# Patient Record
Sex: FEMALE | Race: WHITE | NOT HISPANIC OR LATINO | Employment: FULL TIME | URBAN - METROPOLITAN AREA
[De-identification: names, ages, dates, MRNs, and addresses within clinical notes are randomized per-mention and may not be internally consistent; named-entity substitution may affect disease eponyms.]

---

## 2020-08-20 ENCOUNTER — OFFICE VISIT (OUTPATIENT)
Dept: OBSTETRICS AND GYNECOLOGY | Facility: CLINIC | Age: 30
End: 2020-08-20
Payer: MEDICAID

## 2020-08-20 VITALS
HEIGHT: 64 IN | DIASTOLIC BLOOD PRESSURE: 70 MMHG | SYSTOLIC BLOOD PRESSURE: 110 MMHG | BODY MASS INDEX: 19.96 KG/M2 | WEIGHT: 116.88 LBS

## 2020-08-20 DIAGNOSIS — Z30.40 ENCOUNTER FOR REFILL OF PRESCRIPTION FOR CONTRACEPTION: ICD-10-CM

## 2020-08-20 DIAGNOSIS — Z01.419 WELL WOMAN EXAM: Primary | ICD-10-CM

## 2020-08-20 PROCEDURE — 99385 PR PREVENTIVE VISIT,NEW,18-39: ICD-10-PCS | Mod: S$PBB,,, | Performed by: OBSTETRICS & GYNECOLOGY

## 2020-08-20 PROCEDURE — 99999 PR PBB SHADOW E&M-NEW PATIENT-LVL III: ICD-10-PCS | Mod: PBBFAC,,, | Performed by: OBSTETRICS & GYNECOLOGY

## 2020-08-20 PROCEDURE — 88175 CYTOPATH C/V AUTO FLUID REDO: CPT

## 2020-08-20 PROCEDURE — 99999 PR PBB SHADOW E&M-NEW PATIENT-LVL III: CPT | Mod: PBBFAC,,, | Performed by: OBSTETRICS & GYNECOLOGY

## 2020-08-20 PROCEDURE — 99203 OFFICE O/P NEW LOW 30 MIN: CPT | Mod: PBBFAC,PO | Performed by: OBSTETRICS & GYNECOLOGY

## 2020-08-20 PROCEDURE — 99385 PREV VISIT NEW AGE 18-39: CPT | Mod: S$PBB,,, | Performed by: OBSTETRICS & GYNECOLOGY

## 2020-08-20 PROCEDURE — 87491 CHLMYD TRACH DNA AMP PROBE: CPT

## 2020-08-20 PROCEDURE — 87624 HPV HI-RISK TYP POOLED RSLT: CPT

## 2020-08-20 RX ORDER — ARIPIPRAZOLE 10 MG/1
TABLET ORAL
COMMUNITY
Start: 2020-07-26

## 2020-08-20 RX ORDER — LAMOTRIGINE 100 MG/1
100 TABLET ORAL DAILY
COMMUNITY
Start: 2020-07-31

## 2020-08-20 RX ORDER — NORGESTIMATE AND ETHINYL ESTRADIOL 0.25-0.035
1 KIT ORAL DAILY
Qty: 30 TABLET | Refills: 11 | Status: SHIPPED | OUTPATIENT
Start: 2020-08-20 | End: 2021-08-20

## 2020-08-20 NOTE — PROGRESS NOTES
SUBJECTIVE:   30 y.o. female No obstetric history on file.  for annual routine Pap and checkup. Patient's last menstrual period was 08/09/2020 (exact date)..  She has no unusual complaints. Reports unusually heavy period 2 months ago with normal cycle this past month. OCPs for contraception and patient desires to continue on OCPs. Desires STD testing today.        Past Medical History:   Diagnosis Date    Bipolar 1 disorder      History reviewed. No pertinent surgical history.  Social History     Socioeconomic History    Marital status: Single     Spouse name: Not on file    Number of children: Not on file    Years of education: Not on file    Highest education level: Not on file   Occupational History    Not on file   Social Needs    Financial resource strain: Not on file    Food insecurity     Worry: Not on file     Inability: Not on file    Transportation needs     Medical: Not on file     Non-medical: Not on file   Tobacco Use    Smoking status: Former Smoker   Substance and Sexual Activity    Alcohol use: Yes     Comment: socially    Drug use: Never    Sexual activity: Yes     Partners: Male     Birth control/protection: OCP   Lifestyle    Physical activity     Days per week: Not on file     Minutes per session: Not on file    Stress: Not on file   Relationships    Social connections     Talks on phone: Not on file     Gets together: Not on file     Attends Zoroastrian service: Not on file     Active member of club or organization: Not on file     Attends meetings of clubs or organizations: Not on file     Relationship status: Not on file   Other Topics Concern    Not on file   Social History Narrative    Not on file     Family History   Problem Relation Age of Onset    Diabetes Maternal Grandmother     Hypertension Maternal Grandmother     Stroke Maternal Grandmother     Heart attack Maternal Grandmother     Cancer Neg Hx     Ovarian cancer Neg Hx      OB History   No obstetric history on  "file.         Current Outpatient Medications   Medication Sig Dispense Refill    ARIPiprazole (ABILIFY) 10 MG Tab TAKE 1 2 (ONE HALF) TABLET BY MOUTH ONCE DAILY      lamoTRIgine (LAMICTAL) 100 MG tablet Take 100 mg by mouth once daily.      norgestimate-ethinyl estradioL (SPRINTEC, 28,) 0.25-35 mg-mcg per tablet Take 1 tablet by mouth once daily. 30 tablet 11     No current facility-administered medications for this visit.      Allergies: Patient has no known allergies.     ROS:  Constitutional: no weight loss, weight gain, fever, fatigue  Cardiovascular: No chest pain, palpitations  Respiratory:  Shortness of breath, or cough  Gastrointestinal: No diarrhea, bloody stool, nausea/vomiting, constipation  Genitourinary: No blood in urine, painful urination, urgency of urination, frequency of urination, incomplete emptying, incontinence, abnormal bleeding, painful periods, heavy periods, vaginal discharge, vaginal odor, painful intercourse, sexual problems, bleeding after intercourse.  Skin/Breast: No painful breasts, nipple discharge, masses, rash, ulcers  Neurological: No headache  Musculoskeletal: No joint pain, no muscular pain  Endocrine: No diabetes, hot flashes, hair loss  Psychiatric: No depression.      OBJECTIVE:   The patient appears well, alert, oriented x 3, in no distress.  /70   Ht 5' 4" (1.626 m)   Wt 53 kg (116 lb 13.5 oz)   LMP 08/09/2020 (Exact Date)   BMI 20.06 kg/m²   NECK: negative, no thyromegaly, trachea midline  SKIN: normal and no acne, striae, hirsutism  BREAST EXAM: breasts appear normal, no suspicious masses, no skin or nipple changes or axillary nodes  ABDOMEN: normal findings: soft, non-tender and no hernias, masses, or hepatosplenomegaly  GENITALIA: normal external genitalia, no erythema, no discharge  URETHRA: normal urethra, normal urethral meatus  VAGINA: Normal  CERVIX: no lesions or cervical motion tenderness  UTERUS: normal size, contour, position, consistency, " mobility, non-tender  ADNEXA: normal adnexa      ASSESSMENT:   .Velma was seen today for establish care.    Diagnoses and all orders for this visit:    Well woman exam  -     Liquid-Based Pap Smear, Screening  -     HPV High Risk Genotypes, PCR  -     C. trachomatis/N. gonorrhoeae by AMP DNA    Encounter for refill of prescription for contraception    Other orders  -     norgestimate-ethinyl estradioL (SPRINTEC, 28,) 0.25-35 mg-mcg per tablet; Take 1 tablet by mouth once daily.    F/u in one year for annual exam.     Leelee Acharya MD PGY-1  Obstetrics and Gynecology

## 2020-08-28 LAB
HPV HR 12 DNA SPEC QL NAA+PROBE: NEGATIVE
HPV16 AG SPEC QL: NEGATIVE
HPV18 DNA SPEC QL NAA+PROBE: NEGATIVE

## 2020-09-05 LAB
FINAL PATHOLOGIC DIAGNOSIS: NORMAL
Lab: NORMAL

## 2020-09-12 LAB
C TRACH DNA SPEC QL NAA+PROBE: NOT DETECTED
N GONORRHOEA DNA SPEC QL NAA+PROBE: NOT DETECTED

## 2020-10-30 ENCOUNTER — HOSPITAL ENCOUNTER (EMERGENCY)
Facility: OTHER | Age: 30
Discharge: HOME | End: 2020-10-30
Attending: EMERGENCY MEDICINE
Payer: MEDICAID

## 2020-10-30 VITALS
HEART RATE: 76 BPM | WEIGHT: 115 LBS | SYSTOLIC BLOOD PRESSURE: 117 MMHG | RESPIRATION RATE: 20 BRPM | BODY MASS INDEX: 19.63 KG/M2 | OXYGEN SATURATION: 99 % | HEIGHT: 64 IN | TEMPERATURE: 98 F | DIASTOLIC BLOOD PRESSURE: 63 MMHG

## 2020-10-30 DIAGNOSIS — Z78.9 BODY PIERCING: ICD-10-CM

## 2020-10-30 DIAGNOSIS — L02.91 ABSCESS: Primary | ICD-10-CM

## 2020-10-30 LAB
B-HCG UR QL: NEGATIVE
BACTERIA #/AREA URNS HPF: ABNORMAL /HPF
BILIRUB UR QL STRIP: NEGATIVE
CLARITY UR: CLEAR
COLOR UR: YELLOW
CTP QC/QA: YES
GLUCOSE UR QL STRIP: NEGATIVE
HCV AB SERPL QL IA: NEGATIVE
HGB UR QL STRIP: NEGATIVE
HIV 1+2 AB+HIV1 P24 AG SERPL QL IA: NEGATIVE
KETONES UR QL STRIP: NEGATIVE
LEUKOCYTE ESTERASE UR QL STRIP: ABNORMAL
MICROSCOPIC COMMENT: ABNORMAL
NITRITE UR QL STRIP: NEGATIVE
PH UR STRIP: 7 [PH] (ref 5–8)
PROT UR QL STRIP: NEGATIVE
RBC #/AREA URNS HPF: 0 /HPF (ref 0–4)
SP GR UR STRIP: 1.01 (ref 1–1.03)
SQUAMOUS #/AREA URNS HPF: 90 /HPF
URN SPEC COLLECT METH UR: ABNORMAL
UROBILINOGEN UR STRIP-ACNC: NEGATIVE EU/DL
WBC #/AREA URNS HPF: 8 /HPF (ref 0–5)

## 2020-10-30 PROCEDURE — 10120 INC&RMVL FB SUBQ TISS SMPL: CPT

## 2020-10-30 PROCEDURE — 86803 HEPATITIS C AB TEST: CPT

## 2020-10-30 PROCEDURE — 81025 URINE PREGNANCY TEST: CPT | Performed by: EMERGENCY MEDICINE

## 2020-10-30 PROCEDURE — 25000003 PHARM REV CODE 250: Performed by: PHYSICIAN ASSISTANT

## 2020-10-30 PROCEDURE — 81000 URINALYSIS NONAUTO W/SCOPE: CPT

## 2020-10-30 PROCEDURE — 86703 HIV-1/HIV-2 1 RESULT ANTBDY: CPT

## 2020-10-30 PROCEDURE — 99284 EMERGENCY DEPT VISIT MOD MDM: CPT | Mod: 25

## 2020-10-30 RX ORDER — HYDROCODONE BITARTRATE AND ACETAMINOPHEN 5; 325 MG/1; MG/1
1 TABLET ORAL
Status: COMPLETED | OUTPATIENT
Start: 2020-10-30 | End: 2020-10-30

## 2020-10-30 RX ORDER — LIDOCAINE HYDROCHLORIDE AND EPINEPHRINE 20; 10 MG/ML; UG/ML
10 INJECTION, SOLUTION INFILTRATION; PERINEURAL
Status: COMPLETED | OUTPATIENT
Start: 2020-10-30 | End: 2020-10-30

## 2020-10-30 RX ADMIN — HYDROCODONE BITARTRATE AND ACETAMINOPHEN 1 TABLET: 5; 325 TABLET ORAL at 04:10

## 2020-10-30 RX ADMIN — LIDOCAINE HYDROCHLORIDE,EPINEPHRINE BITARTRATE 10 ML: 20; .01 INJECTION, SOLUTION INFILTRATION; PERINEURAL at 04:10

## 2020-10-30 NOTE — ED PROVIDER NOTES
Encounter Date: 10/30/2020       History     Chief Complaint   Patient presents with    Rash     +Rash to R lower back, has subdermal peircing to R lower back. Seen at  and instructed to come to ED for further eval. Denies fever, admits to nausea. Taking PO clindamycin currently.        Patient is a 30-year-old female presenting to the emergency department for evaluation of possible infection to her low back.  The patient states that she has had a micro dermal piercing in her right low back for the past 8 years.  She states that about 3 days ago, she started noticing pain and redness around.  She states that she has some intermittent drainage.  She admits that she has been seen at urgent care and has been taking clindamycin for but is not getting any better.  She states she is concerned it is getting worse.  She cannot remove it herself.This is the extent of the patient's complaints at this time.       The history is provided by the patient.     Review of patient's allergies indicates:  No Known Allergies  Past Medical History:   Diagnosis Date    Bipolar 1 disorder      History reviewed. No pertinent surgical history.  Family History   Problem Relation Age of Onset    Diabetes Maternal Grandmother     Hypertension Maternal Grandmother     Stroke Maternal Grandmother     Heart attack Maternal Grandmother     Cancer Neg Hx     Ovarian cancer Neg Hx      Social History     Tobacco Use    Smoking status: Former Smoker   Substance Use Topics    Alcohol use: Yes     Comment: socially    Drug use: Never     Review of Systems   Constitutional: Negative for activity change, appetite change, chills, fatigue and fever.   HENT: Negative for congestion, rhinorrhea and sore throat.    Eyes: Negative for photophobia and visual disturbance.   Respiratory: Negative for cough, shortness of breath and wheezing.    Cardiovascular: Negative for chest pain.   Gastrointestinal: Negative for abdominal pain, diarrhea, nausea  and vomiting.   Genitourinary: Negative for dysuria, hematuria and urgency.   Musculoskeletal: Negative for back pain, myalgias and neck pain.   Skin: Negative for color change (erythema) and wound.   Neurological: Negative for weakness and headaches.   Psychiatric/Behavioral: Negative for agitation and confusion.       Physical Exam     Initial Vitals [10/30/20 1537]   BP Pulse Resp Temp SpO2   117/63 76 18 98.4 °F (36.9 °C) 99 %      MAP       --         Physical Exam    Nursing note and vitals reviewed.  Constitutional: Vital signs are normal. She appears well-developed and well-nourished. She is not diaphoretic. No distress.   Well-appearing,  female unaccompanied the emergency room.  Speaking clearly full sentences.  No acute distress.   HENT:   Head: Normocephalic and atraumatic.   Right Ear: External ear normal.   Left Ear: External ear normal.   Nose: Nose normal.   Mouth/Throat: Oropharynx is clear and moist.   Eyes: Conjunctivae and EOM are normal.   Neck: Normal range of motion. Neck supple.   Cardiovascular: Normal rate.   Pulmonary/Chest: No respiratory distress.   Musculoskeletal: Normal range of motion.   Neurological: She is alert and oriented to person, place, and time.   Skin: Skin is warm.        Psychiatric: She has a normal mood and affect. Her behavior is normal. Judgment and thought content normal.         ED Course   Foreign Body    Date/Time: 10/30/2020 5:14 PM  Performed by: Erika Barba PA-C  Authorized by: Julissa Reyes MD   Intake: back.  Anesthesia: local infiltration    Anesthesia:  Local Anesthetic: lidocaine 2% with epinephrine  Anesthetic total: 2 mL  1 objects recovered.  Objects recovered: 1 microdermal piercing  Post-procedure assessment: foreign body removed  Patient tolerance: Patient tolerated the procedure well with no immediate complications      Labs Reviewed   HIV 1 / 2 ANTIBODY   HEPATITIS C ANTIBODY   URINALYSIS, REFLEX TO URINE CULTURE   URINALYSIS  MICROSCOPIC   POCT URINE PREGNANCY             Medical Decision Making:   Initial Assessment:     Urgent evaluation a 30-year-old female presenting to the emergency department for evaluation of an infected piercing to the right low back.  Patient is afebrile, nontoxic appearing, hemodynamically stable.  Physical exam reveals a piercing in place to the right low back with concern for associated infection.  Will plan for incision and drainage ultimate removal piercing.    Clinical Tests:   Lab Tests: Ordered and Reviewed  ED Management:    Piercing was removed.  Also expressed some purulence at the area.  At this time, do not feel any further interventions indicated.  Advised the patient continue taking clindamycin.  Given follow-up instructions.  Discharged home in stable condition.The patient was instructed to follow up with a primary care provider in 2 days or to return to the emergency department for worsening symptoms. The treatment plan was discussed with the patient who demonstrated understanding and comfort with plan. The patient's history, physical exam, and plan of care was discussed with and agreed upon with my supervising physician.     This note was created using [employer]. There may be typographical errors secondary to dictation.                                Clinical Impression:     ICD-10-CM ICD-9-CM   1. Abscess  L02.91 682.9   2. Body piercing  Z78.9 V15.89                          ED Disposition Condition    Discharge Stable        ED Prescriptions     None        Follow-up Information     Follow up With Specialties Details Why Contact Info    Sita Cam MD Obstetrics, Obstetrics and Gynecology   [address] [address]  [phone]      [facility] Emergency Medicine   [address] [facility] [address]  [phone]                                       Erika Barba PA-C  10/30/20 9887

## 2020-10-30 NOTE — ED TRIAGE NOTES
+quarter sized infection noted to right lower back from piercing. Yellowish colored drainage noted from wound. Pt reports she has been taking clindamycin x3 days with no improvement. Pt reports nausea but denies fever, chills, vomiting, sob, cp

## 2021-06-15 ENCOUNTER — NURSE TRIAGE (OUTPATIENT)
Dept: ADMINISTRATIVE | Facility: CLINIC | Age: 31
End: 2021-06-15

## 2023-03-31 ENCOUNTER — HOSPITAL ENCOUNTER (OUTPATIENT)
Facility: OTHER | Age: 33
Discharge: HOME | End: 2023-04-01
Attending: EMERGENCY MEDICINE | Admitting: EMERGENCY MEDICINE
Payer: MEDICAID

## 2023-03-31 DIAGNOSIS — N76.0 BV (BACTERIAL VAGINOSIS): ICD-10-CM

## 2023-03-31 DIAGNOSIS — R11.2 NAUSEA & VOMITING: ICD-10-CM

## 2023-03-31 DIAGNOSIS — K52.9 ENTERITIS: Primary | ICD-10-CM

## 2023-03-31 DIAGNOSIS — B96.89 BV (BACTERIAL VAGINOSIS): ICD-10-CM

## 2023-03-31 LAB
ALBUMIN SERPL BCP-MCNC: 3.9 G/DL (ref 3.5–5.2)
ALP SERPL-CCNC: 61 U/L (ref 55–135)
ALT SERPL W/O P-5'-P-CCNC: 16 U/L (ref 10–44)
ANION GAP SERPL CALC-SCNC: 7 MMOL/L (ref 8–16)
AST SERPL-CCNC: 22 U/L (ref 10–40)
B-HCG UR QL: NEGATIVE
BACTERIA #/AREA URNS HPF: ABNORMAL /HPF
BASOPHILS # BLD AUTO: 0.04 K/UL (ref 0–0.2)
BASOPHILS NFR BLD: 0.4 % (ref 0–1.9)
BILIRUB SERPL-MCNC: 0.4 MG/DL (ref 0.1–1)
BILIRUB UR QL STRIP: NEGATIVE
BUN SERPL-MCNC: 6 MG/DL (ref 6–20)
CALCIUM SERPL-MCNC: 8.8 MG/DL (ref 8.7–10.5)
CHLORIDE SERPL-SCNC: 103 MMOL/L (ref 95–110)
CLARITY UR: CLEAR
CO2 SERPL-SCNC: 25 MMOL/L (ref 23–29)
COLOR UR: YELLOW
CREAT SERPL-MCNC: 0.7 MG/DL (ref 0.5–1.4)
CTP QC/QA: YES
DIFFERENTIAL METHOD: ABNORMAL
EOSINOPHIL # BLD AUTO: 0.1 K/UL (ref 0–0.5)
EOSINOPHIL NFR BLD: 1 % (ref 0–8)
ERYTHROCYTE [DISTWIDTH] IN BLOOD BY AUTOMATED COUNT: 12 % (ref 11.5–14.5)
EST. GFR  (NO RACE VARIABLE): >60 ML/MIN/1.73 M^2
GLUCOSE SERPL-MCNC: 88 MG/DL (ref 70–110)
GLUCOSE UR QL STRIP: NEGATIVE
HCT VFR BLD AUTO: 47.8 % (ref 37–48.5)
HCV AB SERPL QL IA: NEGATIVE
HGB BLD-MCNC: 15.9 G/DL (ref 12–16)
HGB UR QL STRIP: NEGATIVE
HIV 1+2 AB+HIV1 P24 AG SERPL QL IA: NEGATIVE
IMM GRANULOCYTES # BLD AUTO: 0.03 K/UL (ref 0–0.04)
IMM GRANULOCYTES NFR BLD AUTO: 0.3 % (ref 0–0.5)
KETONES UR QL STRIP: NEGATIVE
LDH SERPL L TO P-CCNC: 0.78 MMOL/L (ref 0.5–2.2)
LEUKOCYTE ESTERASE UR QL STRIP: ABNORMAL
LYMPHOCYTES # BLD AUTO: 2 K/UL (ref 1–4.8)
LYMPHOCYTES NFR BLD: 20.9 % (ref 18–48)
MCH RBC QN AUTO: 28.9 PG (ref 27–31)
MCHC RBC AUTO-ENTMCNC: 33.3 G/DL (ref 32–36)
MCV RBC AUTO: 87 FL (ref 82–98)
MICROSCOPIC COMMENT: ABNORMAL
MONOCYTES # BLD AUTO: 0.8 K/UL (ref 0.3–1)
MONOCYTES NFR BLD: 8.3 % (ref 4–15)
NEUTROPHILS # BLD AUTO: 6.7 K/UL (ref 1.8–7.7)
NEUTROPHILS NFR BLD: 69.1 % (ref 38–73)
NITRITE UR QL STRIP: NEGATIVE
NRBC BLD-RTO: 0 /100 WBC
PH UR STRIP: 7 [PH] (ref 5–8)
PLATELET # BLD AUTO: 213 K/UL (ref 150–450)
PMV BLD AUTO: 12 FL (ref 9.2–12.9)
POTASSIUM SERPL-SCNC: 3.9 MMOL/L (ref 3.5–5.1)
PROT SERPL-MCNC: 7 G/DL (ref 6–8.4)
PROT UR QL STRIP: ABNORMAL
RBC # BLD AUTO: 5.51 M/UL (ref 4–5.4)
RBC #/AREA URNS HPF: 1 /HPF (ref 0–4)
SAMPLE: NORMAL
SODIUM SERPL-SCNC: 135 MMOL/L (ref 136–145)
SP GR UR STRIP: 1.02 (ref 1–1.03)
SQUAMOUS #/AREA URNS HPF: 2 /HPF
URN SPEC COLLECT METH UR: ABNORMAL
UROBILINOGEN UR STRIP-ACNC: NEGATIVE EU/DL
WBC # BLD AUTO: 9.65 K/UL (ref 3.9–12.7)
WBC #/AREA URNS HPF: 9 /HPF (ref 0–5)

## 2023-03-31 PROCEDURE — 25000003 PHARM REV CODE 250: Performed by: PHYSICIAN ASSISTANT

## 2023-03-31 PROCEDURE — 96361 HYDRATE IV INFUSION ADD-ON: CPT

## 2023-03-31 PROCEDURE — 63600175 PHARM REV CODE 636 W HCPCS: Performed by: PHYSICIAN ASSISTANT

## 2023-03-31 PROCEDURE — 82272 OCCULT BLD FECES 1-3 TESTS: CPT

## 2023-03-31 PROCEDURE — 25500020 PHARM REV CODE 255: Performed by: PHYSICIAN ASSISTANT

## 2023-03-31 PROCEDURE — 81000 URINALYSIS NONAUTO W/SCOPE: CPT | Performed by: EMERGENCY MEDICINE

## 2023-03-31 PROCEDURE — 99285 EMERGENCY DEPT VISIT HI MDM: CPT | Mod: 25

## 2023-03-31 PROCEDURE — G0378 HOSPITAL OBSERVATION PER HR: HCPCS

## 2023-03-31 PROCEDURE — 87389 HIV-1 AG W/HIV-1&-2 AB AG IA: CPT | Performed by: PHYSICIAN ASSISTANT

## 2023-03-31 PROCEDURE — 96365 THER/PROPH/DIAG IV INF INIT: CPT

## 2023-03-31 PROCEDURE — 96376 TX/PRO/DX INJ SAME DRUG ADON: CPT

## 2023-03-31 PROCEDURE — 80053 COMPREHEN METABOLIC PANEL: CPT | Performed by: PHYSICIAN ASSISTANT

## 2023-03-31 PROCEDURE — 85025 COMPLETE CBC W/AUTO DIFF WBC: CPT | Performed by: PHYSICIAN ASSISTANT

## 2023-03-31 PROCEDURE — 86803 HEPATITIS C AB TEST: CPT | Performed by: PHYSICIAN ASSISTANT

## 2023-03-31 PROCEDURE — 81025 URINE PREGNANCY TEST: CPT | Performed by: EMERGENCY MEDICINE

## 2023-03-31 PROCEDURE — 96375 TX/PRO/DX INJ NEW DRUG ADDON: CPT

## 2023-03-31 PROCEDURE — 87040 BLOOD CULTURE FOR BACTERIA: CPT | Mod: 59 | Performed by: PHYSICIAN ASSISTANT

## 2023-03-31 RX ORDER — ONDANSETRON 2 MG/ML
4 INJECTION INTRAMUSCULAR; INTRAVENOUS EVERY 8 HOURS PRN
Status: DISCONTINUED | OUTPATIENT
Start: 2023-03-31 | End: 2023-04-01 | Stop reason: HOSPADM

## 2023-03-31 RX ORDER — KETOROLAC TROMETHAMINE 30 MG/ML
10 INJECTION, SOLUTION INTRAMUSCULAR; INTRAVENOUS
Status: COMPLETED | OUTPATIENT
Start: 2023-03-31 | End: 2023-03-31

## 2023-03-31 RX ORDER — TALC
6 POWDER (GRAM) TOPICAL NIGHTLY PRN
Status: DISCONTINUED | OUTPATIENT
Start: 2023-03-31 | End: 2023-04-01 | Stop reason: HOSPADM

## 2023-03-31 RX ORDER — HYOSCYAMINE SULFATE 0.5 MG/ML
0.5 INJECTION, SOLUTION SUBCUTANEOUS EVERY 6 HOURS PRN
Status: DISCONTINUED | OUTPATIENT
Start: 2023-03-31 | End: 2023-04-01 | Stop reason: HOSPADM

## 2023-03-31 RX ORDER — KETOROLAC TROMETHAMINE 30 MG/ML
15 INJECTION, SOLUTION INTRAMUSCULAR; INTRAVENOUS EVERY 6 HOURS PRN
Status: DISCONTINUED | OUTPATIENT
Start: 2023-03-31 | End: 2023-04-01 | Stop reason: HOSPADM

## 2023-03-31 RX ORDER — SODIUM CHLORIDE, SODIUM LACTATE, POTASSIUM CHLORIDE, CALCIUM CHLORIDE 600; 310; 30; 20 MG/100ML; MG/100ML; MG/100ML; MG/100ML
INJECTION, SOLUTION INTRAVENOUS CONTINUOUS
Status: DISCONTINUED | OUTPATIENT
Start: 2023-03-31 | End: 2023-04-01 | Stop reason: HOSPADM

## 2023-03-31 RX ORDER — ACETAMINOPHEN 325 MG/1
650 TABLET ORAL EVERY 8 HOURS PRN
Status: DISCONTINUED | OUTPATIENT
Start: 2023-03-31 | End: 2023-04-01 | Stop reason: HOSPADM

## 2023-03-31 RX ORDER — ONDANSETRON 2 MG/ML
4 INJECTION INTRAMUSCULAR; INTRAVENOUS
Status: COMPLETED | OUTPATIENT
Start: 2023-03-31 | End: 2023-03-31

## 2023-03-31 RX ADMIN — HYOSCYAMINE SULFATE 0.5 MG: 0.5 INJECTION, SOLUTION SUBCUTANEOUS at 08:03

## 2023-03-31 RX ADMIN — ONDANSETRON 4 MG: 2 INJECTION INTRAMUSCULAR; INTRAVENOUS at 12:03

## 2023-03-31 RX ADMIN — IOHEXOL 75 ML: 350 INJECTION, SOLUTION INTRAVENOUS at 12:03

## 2023-03-31 RX ADMIN — SODIUM CHLORIDE 1000 ML: 9 INJECTION, SOLUTION INTRAVENOUS at 12:03

## 2023-03-31 RX ADMIN — PIPERACILLIN AND TAZOBACTAM 4.5 G: 4; .5 INJECTION, POWDER, LYOPHILIZED, FOR SOLUTION INTRAVENOUS; PARENTERAL at 01:03

## 2023-03-31 RX ADMIN — KETOROLAC TROMETHAMINE 15 MG: 30 INJECTION, SOLUTION INTRAMUSCULAR; INTRAVENOUS at 11:03

## 2023-03-31 RX ADMIN — KETOROLAC TROMETHAMINE 10 MG: 30 INJECTION, SOLUTION INTRAMUSCULAR; INTRAVENOUS at 12:03

## 2023-03-31 RX ADMIN — Medication 6 MG: at 11:03

## 2023-03-31 RX ADMIN — SODIUM CHLORIDE, POTASSIUM CHLORIDE, SODIUM LACTATE AND CALCIUM CHLORIDE: 600; 310; 30; 20 INJECTION, SOLUTION INTRAVENOUS at 06:03

## 2023-03-31 NOTE — FIRST PROVIDER EVALUATION
Emergency Department TeleTriage Encounter Note      CHIEF COMPLAINT    Chief Complaint   Patient presents with    Abdominal Pain     C/o RLQ pain 5/10, n/d, fever/chills, body aches that began on Wednesday. -vomiting. Denies any other symptoms. Recent miscarriage x 2 wks ago. Stated [place]. VSS        VITAL SIGNS   Initial Vitals [03/31/23 1006]   BP Pulse Resp Temp SpO2   109/72 87 17 98.1 °F (36.7 °C) 100 %      MAP       --            ALLERGIES    Review of patient's allergies indicates:  No Known Allergies    PROVIDER TRIAGE NOTE  ***      ORDERS  Labs Reviewed   URINALYSIS, REFLEX TO URINE CULTURE   POCT URINE PREGNANCY       ED Orders (720h ago, onward)      Start Ordered     Status Ordering Provider    03/31/23 1018 03/31/23 1017  POCT urine pregnancy  Once         Final result EDNA OLVERA    03/31/23 1018 03/31/23 1017  Urinalysis, Reflex to Urine Culture Urine, Clean Catch  STAT         In process EDNA OLVERA              Virtual Visit Note: The provider triage portion of this emergency department evaluation and documentation was performed via [employer], a HIPAA-compliant telemedicine application, in concert with a tele-presenter in the room. A face to face patient evaluation with one of my colleagues will occur once the patient is placed in an emergency department room.      DISCLAIMER: This note was prepared with [employer] voice recognition transcription software. Garbled syntax, mangled pronouns, and other bizarre constructions may be attributed to that software system.

## 2023-03-31 NOTE — ED NOTES
Pt resting on stretcher. AAO x 3. HOB elevated. RR even and unlabored. Pain 0/10. Restroom and comfort needs addressed. NAD noted. Pt updated on plan of care.  Call light within reach. Side rails up x 1. IVF still infusing bolus continuously at a rapid pace. Will continue to monitor. Clear liquid meal tray with 75 % completed, & semi soft extra meals tolerated.

## 2023-03-31 NOTE — ED NOTES
0.9% NS still infusing bolus. About 1800 mL left to infuse. IV site flushed and fluids infusing steadily. No pressure bag needed at present.

## 2023-03-31 NOTE — FIRST PROVIDER EVALUATION
Emergency Department TeleTriage Encounter Note      CHIEF COMPLAINT    Chief Complaint   Patient presents with    Abdominal Pain     C/o RLQ pain 5/10, n/d, fever/chills, body aches that began on Wednesday. -vomiting. Denies any other symptoms. Recent miscarriage x 2 wks ago. Stated [place]. VSS        VITAL SIGNS   Initial Vitals [03/31/23 1006]   BP Pulse Resp Temp SpO2   109/72 87 17 98.1 °F (36.7 °C) 100 %      MAP       --            ALLERGIES    Review of patient's allergies indicates:  No Known Allergies    PROVIDER TRIAGE NOTE  This is a teletriage evaluation of a 32 y.o. female presenting to the ED with c/o RLQ pain with N/V/D, fever. +recent miscarriage     PE: tearful. Non-toxic/well-appearing. No respiratory distress, speaks in full sentences without issue. No active emesis nor cough. Normal eye contact and mentation.     Plan: labs, imaging, meds. Further/augmented workup at discretion of examining provider.     All ED beds are full at present; patient notified of this status.  Patient seen and medically screened by MURTAZA via teletriage. Orders initiated at triage to expedite care.  Patient is stable and will be placed in an ED bed when available.  Care will be transferred to an alternate provider when patient has been placed in an Exam Room further exam, additional orders, and disposition.         ORDERS  Labs Reviewed   URINALYSIS, REFLEX TO URINE CULTURE - Abnormal; Notable for the following components:       Result Value    Protein, UA Trace (*)     Leukocytes, UA Trace (*)     All other components within normal limits    Narrative:     Specimen Source->Urine   URINALYSIS MICROSCOPIC - Abnormal; Notable for the following components:    WBC, UA 9 (*)     All other components within normal limits    Narrative:     Specimen Source->Urine   CBC W/ AUTO DIFFERENTIAL   COMPREHENSIVE METABOLIC PANEL   POCT URINE PREGNANCY       ED Orders (720h ago, onward)      Start  Ordered     Status Ordering Provider    03/31/23 1115 03/31/23 1107  sodium chloride 0.9% bolus 1,000 mL 1,000 mL  ED 1 Time         Ordered BRENDEN PARKER MARIO    03/31/23 1115 03/31/23 1107  ketorolac injection 9.999 mg  ED 1 Time         Ordered BRENDEN PARKER MARIO    03/31/23 1115 03/31/23 1107  ondansetron injection 4 mg  ED 1 Time         Ordered BRENDEN PARKER MARIO    03/31/23 1108 03/31/23 1107  CBC auto differential  STAT         Ordered BRENDEN PARKER MARIO    03/31/23 1108 03/31/23 1107  Comprehensive metabolic panel  STAT         Ordered BRENDEN PARKER MARIO    03/31/23 1108 03/31/23 1107  CT Abdomen Pelvis With Contrast  1 time imaging         Ordered BRENDEN PARKER MARIO    03/31/23 1018 03/31/23 1017  POCT urine pregnancy  Once         Final result EDNA OLVERA    03/31/23 1018 03/31/23 1017  Urinalysis, Reflex to Urine Culture Urine, Clean Catch  STAT         Final result EDNA OLVERA    03/31/23 1017 03/31/23 1017  Urinalysis Microscopic  Once         Final result EDNA OLVERA              Virtual Visit Note: The provider triage portion of this emergency department evaluation and documentation was performed via [employer], a HIPAA-compliant telemedicine application, in concert with a tele-presenter in the room. A face to face patient evaluation with one of my colleagues will occur once the patient is placed in an emergency department room.      DISCLAIMER: This note was prepared with [employer] voice recognition transcription software. Garbled syntax, mangled pronouns, and other bizarre constructions may be attributed to that software system.

## 2023-03-31 NOTE — ED NOTES
Commode hat in place for stool specimen collection. Pt aware stool specimen needed and how to use the hat. Pt also aware to call nurse/tech once specimen has been obtained.

## 2023-03-31 NOTE — H&P
ED Observation Unit  History and Physical      I assumed care of this patient from the Main ED at onset of observation time, 1345 on 03/31/2023.       History of Present Illness:    32-year-old female with PMH of bipolar 1 disorder presents the ED  today for evaluation of lower abdominal pain which began on 3/29. She states recent history is significant for travel to [place].  States she did began with some nausea and pain at that time with some diarrhea.  She states pain has intensified.  She has tried hydrocodone with some modest improvement pain.  She reports fever with T-max of 102° that does reduced with the hydrocodone.  Denies any bloody diarrhea.  Does report some tenesmus and that food exacerbates the pain.  Reports decreased appetite.  Denies any vomiting. She does report a spontaneous miscarriage 2 weeks ago and had outpatient ultrasound confirming no retained products or abnormalities earlier this week.  She denies any recent antibiotic use other than Bactrim last month.  She denies any known sick contacts or suspected food contamination.    ED workup with reassuring findings on blood work.  CT abdomen pelvic revealed:  Fluid-filled small bowel, loops are upper limit of normal in caliber.  Finding is nonspecific, correlation with any history of enteritis.    Patient admitted to ED OU for supportive care for enteritis, abdominal pain.    PMHx   Past Medical History:   Diagnosis Date    Bipolar 1 disorder       No past surgical history on file.     Family Hx   Family History   Problem Relation Age of Onset    Diabetes Maternal Grandmother     Hypertension Maternal Grandmother     Stroke Maternal Grandmother     Heart attack Maternal Grandmother     Cancer Neg Hx     Ovarian cancer Neg Hx         Social Hx   Social History     Socioeconomic History    Marital status: Single   Tobacco Use    Smoking status: Former   Substance and Sexual Activity    Alcohol use: Yes     Comment: socially    Drug use:  "Never    Sexual activity: Yes     Partners: Male     Birth control/protection: OCP        Vital Signs   Vitals:    03/31/23 1006 03/31/23 1400 03/31/23 1502   BP: 109/72 (!) 97/53 (!) 91/59   BP Location: Left arm Right arm    Patient Position: Sitting Lying    Pulse: 87 63 61   Resp: 17 16    Temp: 98.1 °F (36.7 °C) 99.2 °F (37.3 °C)    TempSrc: Oral Oral    SpO2: 100% 100% 100%   Weight: 49.9 kg (110 lb)     Height: 5' 4" (1.626 m)          Review of Systems  As per [facility]    Physical Exam  Nursing note and vital signs reviewed.  Appearance: No acute distress.  Eyes: No conjunctival injection.  ENT: Oropharynx clear.    Chest/ Respiratory: Clear to auscultation bilaterally.  Good air movement.  No wheezes.  No rhonchi. No rales. No accessory muscle use.  Cardiovascular: Regular rate and rhythm.  No murmurs. No gallops. No rubs.  Abdomen: Soft.  Not distended. Lower abdomen TTP.   No guarding.  No rebound. Non-peritoneal.  Musculoskeletal: Good range of motion all joints.  No deformities.  Neck supple.  No meningismus.  Skin: No rashes seen.  Good turgor.  No abrasions.  No ecchymoses.  Neurologic: Motor intact.  Sensation intact.    Mental Status:  Alert and oriented x 3.  Appropriate, conversant.     Medications:   Scheduled Meds:  Continuous Infusions:   lactated ringers       PRN Meds:.acetaminophen, hyoscyamine, ketorolac, melatonin, ondansetron, promethazine (PHENERGAN) IVPB      Assessment/Plan:    1) enteritis  -given Zosyn in the ED.  Will hold off on additional antibiotics pending stool cultures if able to obtain.  -IV fluids    2) abdominal pain  - p.r.n. analgesics and antispasmodics.    Case was discussed with the ED provider, Krista Tabor PA-C.         "

## 2023-03-31 NOTE — ED PROVIDER NOTES
Encounter Date: 3/31/2023       History     Chief Complaint   Patient presents with    Abdominal Pain     C/o RLQ pain 5/10, n/d, fever/chills, body aches that began on Wednesday. -vomiting. Denies any other symptoms. Recent miscarriage x 2 wks ago. Stated just return from out the country on Wednesday. VSS      Afebrile 32-year-old female with PMH of bipolar 1 disorder presents the ED for evaluation of lower abdominal pain.  He reports that pain began on Wednesday.  She states recent history is significant for travel to [place].  States she did began with some nausea and pain at that time with some diarrhea.  She states pain has intensified.  She has tried hydrocodone with some modest improvement pain.  She reports fever with T-max of 102° that does reduced with the hydrocodone.  Denies any bloody diarrhea.  Does report some tenesmus and that food exacerbates the pain.  Reports decreased appetite.  Denies any vomiting.  Denies any URI symptoms.  Denies any  symptoms including vaginal bleeding or discharge.  She does report miss spontaneous a be 2 weeks ago and had outpatient ultrasound confirming no retained products or abnormalities earlier this week.  She denies any recent antibiotic use other than Bactrim last month.  She denies any known sick contacts or suspected food contamination    The history is provided by the patient.   Review of patient's allergies indicates:  No Known Allergies  Past Medical History:   Diagnosis Date    Bipolar 1 disorder      No past surgical history on file.  Family History   Problem Relation Age of Onset    Diabetes Maternal Grandmother     Hypertension Maternal Grandmother     Stroke Maternal Grandmother     Heart attack Maternal Grandmother     Cancer Neg Hx     Ovarian cancer Neg Hx      Social History     Tobacco Use    Smoking status: Former   Substance Use Topics    Alcohol use: Yes     Comment: socially    Drug use: Never     Review of Systems  see HPI   Physical Exam      Initial Vitals [03/31/23 1006]   BP Pulse Resp Temp SpO2   109/72 87 17 98.1 °F (36.7 °C) 100 %      MAP       --         Physical Exam    Nursing note and vitals reviewed.  Constitutional: Vital signs are normal. She appears well-developed and well-nourished. She is cooperative.  Non-toxic appearance. She does not appear ill. She appears distressed.   HENT:   Head: Normocephalic and atraumatic.   Dry mucous membranes   Eyes: Conjunctivae and lids are normal.   Neck: Trachea normal. Neck supple. No stridor present. No tracheal deviation present.   Normal range of motion.  Cardiovascular:  Normal rate and regular rhythm.           Abdominal: Abdomen is soft. There is abdominal tenderness in the right lower quadrant and suprapubic area.   No right CVA tenderness.  No left CVA tenderness. There is guarding and tenderness at McBurney's point. negative obturator sign, negative psoas sign and negative Rovsing's sign  Musculoskeletal:      Cervical back: Normal range of motion and neck supple.     Neurological: She is alert and oriented to person, place, and time. GCS eye subscore is 4. GCS verbal subscore is 5. GCS motor subscore is 6.   Skin: Skin is warm, dry and intact. No rash noted.   Psychiatric: She has a normal mood and affect. Her speech is normal and behavior is normal. Thought content normal.       ED Course   Procedures  Labs Reviewed   URINALYSIS, REFLEX TO URINE CULTURE - Abnormal; Notable for the following components:       Result Value    Protein, UA Trace (*)     Leukocytes, UA Trace (*)     All other components within normal limits    Narrative:     Specimen Source->Urine   URINALYSIS MICROSCOPIC - Abnormal; Notable for the following components:    WBC, UA 9 (*)     All other components within normal limits    Narrative:     Specimen Source->Urine   CBC W/ AUTO DIFFERENTIAL - Abnormal; Notable for the following components:    RBC 5.51 (*)     All other components within normal limits   COMPREHENSIVE  METABOLIC PANEL - Abnormal; Notable for the following components:    Sodium 135 (*)     Anion Gap 7 (*)     All other components within normal limits   CULTURE, BLOOD   CULTURE, BLOOD   CLOSTRIDIUM DIFFICILE   CULTURE, STOOL   HIV 1 / 2 ANTIBODY    Narrative:     Release to patient->Immediate   HEPATITIS C ANTIBODY    Narrative:     Release to patient->Immediate   OCCULT BLOOD X 1, STOOL   WBC, STOOL   ROTAVIRUS ANTIGEN, STOOL   CALPROTECTIN, STOOL   GIARDIA/CRYPTOSPORIDIUM (EIA)   STOOL EXAM-OVA,CYSTS,PARASITES   POCT URINE PREGNANCY   ISTAT LACTATE          Imaging Results              CT Abdomen Pelvis With Contrast (Final result)  Result time 03/31/23 12:40:22      Final result by Adelso Braga MD (03/31/23 12:40:22)                   Impression:      1. Fluid-filled small bowel, loops are upper limit of normal in caliber.  Finding is nonspecific, correlation with any history of enteritis.  2. Suspected uterine leiomyoma, further evaluation with ultrasound as warranted.  3. Please see above for additional findings.      Electronically signed by: Adelso Braga MD  Date:    03/31/2023  Time:    12:40               Narrative:    EXAMINATION:  CT ABDOMEN PELVIS WITH CONTRAST    CLINICAL HISTORY:  RLQ abdominal pain (Age >= 14y);    TECHNIQUE:  Low dose axial images, sagittal and coronal reformations were obtained from the lung bases to the pubic symphysis following the IV administration of 75 mL of Omnipaque 350 .  Oral contrast was not given.    COMPARISON:  None.    FINDINGS:  Images of the lower thorax are remarkable for bilateral dependent atelectasis.    The liver, spleen, pancreas, gallbladder and adrenal glands are grossly unremarkable.  There is no biliary dilation or ascites.  The portal vein, splenic vein, SMV, celiac axis and SMA all are patent.  No significant abdominal lymphadenopathy.    The kidneys enhance symmetrically without hydronephrosis.  There is right nonobstructive nephrolithiasis.   The bilateral ureters are unable to be followed in their entirety the urinary bladder, no definite calculi seen or secondary findings to suggest obstructive uropathy.  The urinary bladder is distended without wall thickening.  The uterus has a somewhat lobular contour.  There is a dominant follicle or small cyst within the right ovary measuring 1.6 cm.  The left adnexa is unremarkable.  There is a small amount of fluid in the pelvis.    The large bowel is for the most part decompressed.  Terminal ileum and appendix are unremarkable.  There are several fluid-filled small bowel loops throughout the abdomen and pelvis, some of which are upper limit of normal in caliber.  No significant small bowel wall thickening.  There are several scattered shotty periaortic, pericaval, and mesenteric lymph nodes.  No focal organized pelvic fluid collection.    No acute osseous abnormality.  No significant inguinal lymphadenopathy.                                       Medications   melatonin tablet 6 mg (has no administration in time range)   acetaminophen tablet 650 mg (has no administration in time range)   lactated ringers infusion (has no administration in time range)   ketorolac injection 15 mg (has no administration in time range)   ondansetron injection 4 mg (has no administration in time range)   promethazine (PHENERGAN) 12.5 mg in dextrose 5 % (D5W) 50 mL IVPB (has no administration in time range)   hyoscyamine injection 0.5 mg (has no administration in time range)   sodium chloride 0.9% bolus 1,000 mL 1,000 mL (1,000 mLs Intravenous New Bag 3/31/23 1214)   ketorolac injection 9.999 mg (9.999 mg Intravenous Given 3/31/23 1213)   ondansetron injection 4 mg (4 mg Intravenous Given 3/31/23 1212)   piperacillin-tazobactam (ZOSYN) 4.5 g in dextrose 5 % in water (D5W) 5 % 100 mL IVPB (MB+) (4.5 g Intravenous New Bag 3/31/23 1302)   sodium chloride 0.9% bolus 1,000 mL 1,000 mL (1,000 mLs Intravenous New Bag 3/31/23 1213)   iohexoL  (OMNIPAQUE 350) injection 75 mL (75 mLs Intravenous Given 3/31/23 1230)     Medical Decision Making:   History:   Old Medical Records: I decided to obtain old medical records.  Initial Assessment:   Emergent evaluation of 32-year-old who is typically well presents to the ED evaluation of lower abdominal pain with associated nausea diarrhea that has since improved.  She appears uncomfortable secondary to pain.  Afebrile.  Mucous membranes dry.  Exam notable for tenderness palpation of suprapubic and right lower quadrant, McBurney point tenderness with some guarding; no rebound or rigidity.  Differential Diagnosis:   Differential Diagnosis includes, but is not limited to:  AAA, aortic dissection, mesenteric ischemia, perforated viscous, MI/ACS, SBO/volvulus, incarcerated/strangulated hernia, intussusception, ileus, appendicitis, cholecystitis, cholangitis, diverticulitis, esophagitis, hepatitis, nephrolithiasis, pancreatitis, gastroenteritis, colitis, IBD/IBS, biliary colic, GERD, PUD, constipation, UTI/pyelonephritis,  disorder.      Clinical Tests:   Lab Tests: Reviewed and Ordered  Radiological Study: Reviewed and Ordered  ED Management:  Patient seen in tele triage process were initial labs and CT were ordered.  Will add additional labs including blood cultures given the fever and high suspicion of intra-abdominal process will start empiric antibiotics and 2 L of IV fluids ordered as per her weight.  Lactic is normal.  No leukocytosis or left shift.  Mild hyponatremia consistent with dehydration.  No overt infections in urine.  CT reveals dilated small bowel loops.  Given her presentation suspicious for diarrheal illness.  Questionable uterine leiomyoma however as she had ultrasound earlier this week lower suspicion and did not feel ultrasound warranted at this time.  UPT is negative once again consistent with patient's history of no pregnancy.  She reports some improvement with Toradol and IV fluids here.   Given the travel and overall presentation felt reasonable to watch for serial belly checks, IV hydration and analgesics.  Discussed with patient she is agreeable.  Discuss with AAMIR Liriano who is agreeable to place in observation unit.  We will not initiate any additional antibiotics at this time until further stool studies resolve                        Clinical Impression:   Final diagnoses:  [R11.2] Nausea & vomiting  [K52.9] Enteritis (Primary)        ED Disposition Condition    Observation Stable                AAMIR Dumont  03/31/23 8780

## 2023-03-31 NOTE — Clinical Note
Diagnosis: Enteritis [313237]   Future Attending Provider: EDNA OLVERA [[id number]]   Is the patient being sent to ED Observation?: Yes   Admitting Provider:: EDNA OLVERA [[id number]]   Special Needs:: No Special Needs [1]

## 2023-04-01 VITALS
DIASTOLIC BLOOD PRESSURE: 71 MMHG | HEIGHT: 64 IN | HEART RATE: 56 BPM | TEMPERATURE: 98 F | OXYGEN SATURATION: 100 % | WEIGHT: 110 LBS | SYSTOLIC BLOOD PRESSURE: 111 MMHG | BODY MASS INDEX: 18.78 KG/M2 | RESPIRATION RATE: 15 BRPM

## 2023-04-01 LAB
ANION GAP SERPL CALC-SCNC: 5 MMOL/L (ref 8–16)
BACTERIA GENITAL QL WET PREP: ABNORMAL
BASOPHILS # BLD AUTO: 0.03 K/UL (ref 0–0.2)
BASOPHILS NFR BLD: 0.5 % (ref 0–1.9)
BUN SERPL-MCNC: 4 MG/DL (ref 6–20)
CALCIUM SERPL-MCNC: 8 MG/DL (ref 8.7–10.5)
CHLORIDE SERPL-SCNC: 110 MMOL/L (ref 95–110)
CLUE CELLS VAG QL WET PREP: ABNORMAL
CO2 SERPL-SCNC: 22 MMOL/L (ref 23–29)
CREAT SERPL-MCNC: 0.7 MG/DL (ref 0.5–1.4)
DIFFERENTIAL METHOD: NORMAL
EOSINOPHIL # BLD AUTO: 0.1 K/UL (ref 0–0.5)
EOSINOPHIL NFR BLD: 1.4 % (ref 0–8)
ERYTHROCYTE [DISTWIDTH] IN BLOOD BY AUTOMATED COUNT: 12.2 % (ref 11.5–14.5)
EST. GFR  (NO RACE VARIABLE): >60 ML/MIN/1.73 M^2
FILAMENT FUNGI VAG WET PREP-#/AREA: ABNORMAL
GLUCOSE SERPL-MCNC: 85 MG/DL (ref 70–110)
HCT VFR BLD AUTO: 38.3 % (ref 37–48.5)
HGB BLD-MCNC: 12.4 G/DL (ref 12–16)
IMM GRANULOCYTES # BLD AUTO: 0.02 K/UL (ref 0–0.04)
IMM GRANULOCYTES NFR BLD AUTO: 0.3 % (ref 0–0.5)
LYMPHOCYTES # BLD AUTO: 2.1 K/UL (ref 1–4.8)
LYMPHOCYTES NFR BLD: 33.2 % (ref 18–48)
MCH RBC QN AUTO: 28.4 PG (ref 27–31)
MCHC RBC AUTO-ENTMCNC: 32.4 G/DL (ref 32–36)
MCV RBC AUTO: 88 FL (ref 82–98)
MONOCYTES # BLD AUTO: 0.4 K/UL (ref 0.3–1)
MONOCYTES NFR BLD: 6.9 % (ref 4–15)
NEUTROPHILS # BLD AUTO: 3.7 K/UL (ref 1.8–7.7)
NEUTROPHILS NFR BLD: 57.7 % (ref 38–73)
NRBC BLD-RTO: 0 /100 WBC
OB PNL STL: NEGATIVE
PLATELET # BLD AUTO: 180 K/UL (ref 150–450)
PMV BLD AUTO: 12.5 FL (ref 9.2–12.9)
POTASSIUM SERPL-SCNC: 4.3 MMOL/L (ref 3.5–5.1)
RBC # BLD AUTO: 4.36 M/UL (ref 4–5.4)
SODIUM SERPL-SCNC: 137 MMOL/L (ref 136–145)
SPECIMEN SOURCE: ABNORMAL
T VAGINALIS GENITAL QL WET PREP: ABNORMAL
WBC # BLD AUTO: 6.38 K/UL (ref 3.9–12.7)
WBC #/AREA VAG WET PREP: ABNORMAL
YEAST GENITAL QL WET PREP: ABNORMAL

## 2023-04-01 PROCEDURE — 82272 OCCULT BLD FECES 1-3 TESTS: CPT | Performed by: PHYSICIAN ASSISTANT

## 2023-04-01 PROCEDURE — 25000003 PHARM REV CODE 250

## 2023-04-01 PROCEDURE — 87177 OVA AND PARASITES SMEARS: CPT | Performed by: PHYSICIAN ASSISTANT

## 2023-04-01 PROCEDURE — 87591 N.GONORRHOEAE DNA AMP PROB: CPT

## 2023-04-01 PROCEDURE — 80048 BASIC METABOLIC PNL TOTAL CA: CPT | Performed by: PHYSICIAN ASSISTANT

## 2023-04-01 PROCEDURE — 96361 HYDRATE IV INFUSION ADD-ON: CPT

## 2023-04-01 PROCEDURE — 87210 SMEAR WET MOUNT SALINE/INK: CPT

## 2023-04-01 PROCEDURE — 83993 ASSAY FOR CALPROTECTIN FECAL: CPT | Performed by: PHYSICIAN ASSISTANT

## 2023-04-01 PROCEDURE — G0378 HOSPITAL OBSERVATION PER HR: HCPCS

## 2023-04-01 PROCEDURE — 96376 TX/PRO/DX INJ SAME DRUG ADON: CPT

## 2023-04-01 PROCEDURE — 87449 NOS EACH ORGANISM AG IA: CPT | Performed by: PHYSICIAN ASSISTANT

## 2023-04-01 PROCEDURE — 87209 SMEAR COMPLEX STAIN: CPT | Performed by: PHYSICIAN ASSISTANT

## 2023-04-01 PROCEDURE — 87329 GIARDIA AG IA: CPT | Performed by: PHYSICIAN ASSISTANT

## 2023-04-01 PROCEDURE — 96372 THER/PROPH/DIAG INJ SC/IM: CPT | Mod: 59

## 2023-04-01 PROCEDURE — 25000003 PHARM REV CODE 250: Performed by: PHYSICIAN ASSISTANT

## 2023-04-01 PROCEDURE — 63600175 PHARM REV CODE 636 W HCPCS: Performed by: PHYSICIAN ASSISTANT

## 2023-04-01 PROCEDURE — 63600175 PHARM REV CODE 636 W HCPCS

## 2023-04-01 PROCEDURE — 87425 ROTAVIRUS AG IA: CPT | Performed by: PHYSICIAN ASSISTANT

## 2023-04-01 PROCEDURE — 85025 COMPLETE CBC W/AUTO DIFF WBC: CPT | Performed by: PHYSICIAN ASSISTANT

## 2023-04-01 PROCEDURE — 89055 LEUKOCYTE ASSESSMENT FECAL: CPT | Performed by: PHYSICIAN ASSISTANT

## 2023-04-01 PROCEDURE — 87046 STOOL CULTR AEROBIC BACT EA: CPT | Performed by: PHYSICIAN ASSISTANT

## 2023-04-01 PROCEDURE — 87045 FECES CULTURE AEROBIC BACT: CPT | Performed by: PHYSICIAN ASSISTANT

## 2023-04-01 PROCEDURE — 87427 SHIGA-LIKE TOXIN AG IA: CPT | Mod: 59 | Performed by: PHYSICIAN ASSISTANT

## 2023-04-01 RX ORDER — POLYETHYLENE GLYCOL 3350 17 G/17G
17 POWDER, FOR SOLUTION ORAL DAILY
Status: DISCONTINUED | OUTPATIENT
Start: 2023-04-01 | End: 2023-04-01 | Stop reason: HOSPADM

## 2023-04-01 RX ORDER — CEFTRIAXONE 500 MG/1
500 INJECTION, POWDER, FOR SOLUTION INTRAMUSCULAR; INTRAVENOUS
Status: COMPLETED | OUTPATIENT
Start: 2023-04-01 | End: 2023-04-01

## 2023-04-01 RX ORDER — BISACODYL 5 MG
5 TABLET, DELAYED RELEASE (ENTERIC COATED) ORAL DAILY PRN
Status: DISCONTINUED | OUTPATIENT
Start: 2023-04-01 | End: 2023-04-01 | Stop reason: HOSPADM

## 2023-04-01 RX ORDER — POLYETHYLENE GLYCOL 3350 17 G/17G
17 POWDER, FOR SOLUTION ORAL DAILY
Qty: 10 EACH | Refills: 0 | Status: SHIPPED | OUTPATIENT
Start: 2023-04-01

## 2023-04-01 RX ORDER — ONDANSETRON 4 MG/1
4 TABLET, ORALLY DISINTEGRATING ORAL EVERY 8 HOURS PRN
Qty: 30 TABLET | Refills: 0 | Status: SHIPPED | OUTPATIENT
Start: 2023-04-01

## 2023-04-01 RX ORDER — KETOROLAC TROMETHAMINE 10 MG/1
10 TABLET, FILM COATED ORAL EVERY 6 HOURS
Qty: 20 TABLET | Refills: 0 | Status: SHIPPED | OUTPATIENT
Start: 2023-04-01 | End: 2023-04-06

## 2023-04-01 RX ORDER — METRONIDAZOLE 500 MG/1
500 TABLET ORAL
Status: COMPLETED | OUTPATIENT
Start: 2023-04-01 | End: 2023-04-01

## 2023-04-01 RX ORDER — METRONIDAZOLE 500 MG/1
500 TABLET ORAL EVERY 12 HOURS
Qty: 14 TABLET | Refills: 0 | Status: SHIPPED | OUTPATIENT
Start: 2023-04-01 | End: 2023-04-08

## 2023-04-01 RX ORDER — DOXYCYCLINE 100 MG/1
100 CAPSULE ORAL 2 TIMES DAILY
Qty: 14 CAPSULE | Refills: 0 | Status: SHIPPED | OUTPATIENT
Start: 2023-04-01 | End: 2023-04-08

## 2023-04-01 RX ORDER — CALCIUM CARBONATE 200(500)MG
1000 TABLET,CHEWABLE ORAL
Status: COMPLETED | OUTPATIENT
Start: 2023-04-01 | End: 2023-04-01

## 2023-04-01 RX ADMIN — CEFTRIAXONE SODIUM 500 MG: 500 INJECTION, POWDER, FOR SOLUTION INTRAMUSCULAR; INTRAVENOUS at 04:04

## 2023-04-01 RX ADMIN — CALCIUM CARBONATE 1000 MG: 500 TABLET, CHEWABLE ORAL at 03:04

## 2023-04-01 RX ADMIN — KETOROLAC TROMETHAMINE 15 MG: 30 INJECTION, SOLUTION INTRAMUSCULAR; INTRAVENOUS at 09:04

## 2023-04-01 RX ADMIN — METRONIDAZOLE 500 MG: 500 TABLET ORAL at 04:04

## 2023-04-01 RX ADMIN — KETOROLAC TROMETHAMINE 15 MG: 30 INJECTION, SOLUTION INTRAMUSCULAR; INTRAVENOUS at 04:04

## 2023-04-01 RX ADMIN — BISACODYL 5 MG: 5 TABLET, COATED ORAL at 03:04

## 2023-04-01 RX ADMIN — ONDANSETRON 4 MG: 2 INJECTION INTRAMUSCULAR; INTRAVENOUS at 09:04

## 2023-04-01 RX ADMIN — POLYETHYLENE GLYCOL 3350 17 G: 17 POWDER, FOR SOLUTION ORAL at 09:04

## 2023-04-01 NOTE — ED NOTES
C/o cramping pain returning to abdomen. Discussed with Lizet RUTLEDGE, toradol to be given as ordered.

## 2023-04-01 NOTE — PROGRESS NOTES
ED Observation Unit  Progress Note      HPI   32-year-old female with PMH of bipolar 1 disorder presents the ED  today for evaluation of lower abdominal pain which began on 3/29. She states recent history is significant for travel to [place].  States she did began with some nausea and pain at that time with some diarrhea.  She states pain has intensified.  She has tried hydrocodone with some modest improvement pain.  She reports fever with T-max of 102° that does reduced with the hydrocodone.  Denies any bloody diarrhea.  Does report some tenesmus and that food exacerbates the pain.  Reports decreased appetite.  Denies any vomiting. She does report a spontaneous miscarriage 2 weeks ago and had outpatient ultrasound confirming no retained products or abnormalities earlier this week.  She denies any recent antibiotic use other than Bactrim last month.  She denies any known sick contacts or suspected food contamination.     ED workup with reassuring findings on blood work.  CT abdomen pelvic revealed:  Fluid-filled small bowel, loops are upper limit of normal in caliber.  Finding is nonspecific, correlation with any history of enteritis.     Patient admitted to ED OU for supportive care for enteritis, abdominal pain    Interval History   -VSS overnight, remains afebrile   - Some improvement in pain, now rates 6/10 versus 10/10, but no significant resolution  - No bowel movement yet, was given miralax and is drinking coffee and hydrating with PO and IV fluids  - advanced to soft diet this morning without worsening of symptoms, no episodes of emesis    PMHx   Past Medical History:   Diagnosis Date    Bipolar 1 disorder       No past surgical history on file.     Family Hx   Family History   Problem Relation Age of Onset    Diabetes Maternal Grandmother     Hypertension Maternal Grandmother     Stroke Maternal Grandmother     Heart attack Maternal Grandmother     Cancer Neg Hx     Ovarian cancer Neg Hx         Social  Teaching Flowsheet   Relevant Diagnosis: Trigger thumb of right hand  Teaching Topic: Right trigger thumb release    CSC under local anesthetic with Dr Pete Chandra  BMI 19.85     Person(s) involved in teaching:   Patient     Motivation Level:  Asks Questions: Yes  Eager to Learn: Yes  Cooperative: Yes  Receptive (willing/able to accept information): Yes  Any cultural factors/Confucianism beliefs that may influence understanding or compliance? No       Patient demonstrates understanding of the following:  Reason for the appointment, diagnosis and treatment plan: Yes  Knowledge of proper use of medications and conditions for which they are ordered (with special attention to potential side effects or drug interactions): Yes  Which situations necessitate calling provider and whom to contact: Yes       Teaching Concerns Addressed:        Proper use and care of  (medical equip, care aids, etc.): Yes  Nutritional needs and diet plan: Yes  Pain management techniques: Yes  Wound Care: Yes  How and/when to access community resources: Yes     Instructional Materials Used/Given: Preoperative teaching packet, antibacterial soap.     Time spent with patient: 5 minutes. Karin Washington RN       Hx   Social History     Socioeconomic History    Marital status: Single   Tobacco Use    Smoking status: Former   Substance and Sexual Activity    Alcohol use: Yes     Comment: socially    Drug use: Never    Sexual activity: Yes     Partners: Male     Birth control/protection: OCP        Vital Signs   Vitals:    04/01/23 0022 04/01/23 0400 04/01/23 0425 04/01/23 0810   BP: (!) 101/54 (!) 93/51 (!) 93/51 96/65   BP Location:  Right arm  Right arm   Patient Position:  Lying  Lying   Pulse: 61 (!) 59 (!) 49 (!) 52   Resp: 15 18 15    Temp: 98.6 °F (37 °C) 97.9 °F (36.6 °C) 97.9 °F (36.6 °C) 98.3 °F (36.8 °C)   TempSrc:    Oral   SpO2: 100%  100% 100%   Weight:       Height:            Review of Systems  Review of Systems   Constitutional:  Negative for chills and fever.   HENT:  Negative for congestion, nosebleeds and sore throat.    Eyes:  Negative for blurred vision, double vision and photophobia.   Respiratory:  Negative for cough and shortness of breath.    Cardiovascular:  Negative for chest pain, claudication and leg swelling.   Gastrointestinal:  Positive for abdominal pain. Negative for diarrhea, nausea and vomiting.   Genitourinary:  Negative for dysuria and urgency.   Musculoskeletal:  Negative for back pain and neck pain.   Skin:  Negative for itching and rash.   Neurological:  Negative for dizziness, weakness and headaches.     Brief Physical Exam/Reassessment   Physical Exam  Constitutional:       General: She is not in acute distress.     Appearance: Normal appearance. She is not toxic-appearing.   HENT:      Head: Normocephalic and atraumatic.      Nose: Nose normal.      Mouth/Throat:      Mouth: Mucous membranes are moist.   Cardiovascular:      Rate and Rhythm: Normal rate and regular rhythm.   Pulmonary:      Effort: Pulmonary effort is normal. No respiratory distress.   Abdominal:      General: Abdomen is flat. Bowel sounds are normal. There is no distension.      Palpations: Abdomen is soft.       Tenderness: There is abdominal tenderness in the right lower quadrant, suprapubic area and left lower quadrant. There is guarding (voluntary). There is no rebound. Negative signs include Ayala's sign and Rovsing's sign.      Comments: Some lower abdominal bloating noted   Musculoskeletal:         General: Normal range of motion.      Cervical back: Normal range of motion.   Skin:     General: Skin is warm and dry.   Neurological:      General: No focal deficit present.      Mental Status: She is alert and oriented to person, place, and time. Mental status is at baseline.   Psychiatric:         Mood and Affect: Mood normal.         Behavior: Behavior normal.       Labs/Imaging   Labs Reviewed   URINALYSIS, REFLEX TO URINE CULTURE - Abnormal; Notable for the following components:       Result Value    Protein, UA Trace (*)     Leukocytes, UA Trace (*)     All other components within normal limits    Narrative:     Specimen Source->Urine   URINALYSIS MICROSCOPIC - Abnormal; Notable for the following components:    WBC, UA 9 (*)     All other components within normal limits    Narrative:     Specimen Source->Urine   CBC W/ AUTO DIFFERENTIAL - Abnormal; Notable for the following components:    RBC 5.51 (*)     All other components within normal limits   COMPREHENSIVE METABOLIC PANEL - Abnormal; Notable for the following components:    Sodium 135 (*)     Anion Gap 7 (*)     All other components within normal limits   BASIC METABOLIC PANEL - Abnormal; Notable for the following components:    CO2 22 (*)     BUN 4 (*)     Calcium 8.0 (*)     Anion Gap 5 (*)     All other components within normal limits   CULTURE, BLOOD   CULTURE, BLOOD   CLOSTRIDIUM DIFFICILE   CULTURE, STOOL   HIV 1 / 2 ANTIBODY    Narrative:     Release to patient->Immediate   HEPATITIS C ANTIBODY    Narrative:     Release to patient->Immediate   CBC W/ AUTO DIFFERENTIAL   OCCULT BLOOD X 1, STOOL   WBC, STOOL   ROTAVIRUS ANTIGEN, STOOL   CALPROTECTIN,  STOOL   GIARDIA/CRYPTOSPORIDIUM (EIA)   STOOL EXAM-OVA,CYSTS,PARASITES   POCT URINE PREGNANCY   ISTAT LACTATE      Imaging Results              CT Abdomen Pelvis With Contrast (Final result)  Result time 03/31/23 12:40:22      Final result by Adelso Braga MD (03/31/23 12:40:22)                   Impression:      1. Fluid-filled small bowel, loops are upper limit of normal in caliber.  Finding is nonspecific, correlation with any history of enteritis.  2. Suspected uterine leiomyoma, further evaluation with ultrasound as warranted.  3. Please see above for additional findings.      Electronically signed by: Adelso Braga MD  Date:    03/31/2023  Time:    12:40               Narrative:    EXAMINATION:  CT ABDOMEN PELVIS WITH CONTRAST    CLINICAL HISTORY:  RLQ abdominal pain (Age >= 14y);    TECHNIQUE:  Low dose axial images, sagittal and coronal reformations were obtained from the lung bases to the pubic symphysis following the IV administration of 75 mL of Omnipaque 350 .  Oral contrast was not given.    COMPARISON:  None.    FINDINGS:  Images of the lower thorax are remarkable for bilateral dependent atelectasis.    The liver, spleen, pancreas, gallbladder and adrenal glands are grossly unremarkable.  There is no biliary dilation or ascites.  The portal vein, splenic vein, SMV, celiac axis and SMA all are patent.  No significant abdominal lymphadenopathy.    The kidneys enhance symmetrically without hydronephrosis.  There is right nonobstructive nephrolithiasis.  The bilateral ureters are unable to be followed in their entirety the urinary bladder, no definite calculi seen or secondary findings to suggest obstructive uropathy.  The urinary bladder is distended without wall thickening.  The uterus has a somewhat lobular contour.  There is a dominant follicle or small cyst within the right ovary measuring 1.6 cm.  The left adnexa is unremarkable.  There is a small amount of fluid in the pelvis.    The large  bowel is for the most part decompressed.  Terminal ileum and appendix are unremarkable.  There are several fluid-filled small bowel loops throughout the abdomen and pelvis, some of which are upper limit of normal in caliber.  No significant small bowel wall thickening.  There are several scattered shotty periaortic, pericaval, and mesenteric lymph nodes.  No focal organized pelvic fluid collection.    No acute osseous abnormality.  No significant inguinal lymphadenopathy.                                       I reviewed all labs, imaging, EKGs.     Plan   1. Lower abdominal pain  - Add pelvic US to r/o torsion or specific pelvic etiology. Suspect symptoms are GI in nature, but she describes the pain as pelvic and cramping and does have a history of recent miscarriage.  - Has not had a pelvic exam since prior to miscarriage, will perform  - Continue IVF and walking around to encourage bowel movement.      I have discussed this case with ED provider, Dr. Reina.

## 2023-04-01 NOTE — ED NOTES
Rec'd report from FESTUS Hidalgo. Patient is pending admission to hospital for observation. Pt in no acute distress at time of handoff. Pt has IVF infusing per order.   Alert and oriented w/ family at bedside. Pt updated on plan of care.

## 2023-04-01 NOTE — ED NOTES
Patient reports she is still unable to produce a stool sample at this time. Pt instructed on notifying nurse when a sample is available.

## 2023-04-01 NOTE — DISCHARGE SUMMARY
[facility] ED Observation Unit  Discharge Summary        History of Present Illness:    32-year-old female with PMH of bipolar 1 disorder presents the ED  today for evaluation of lower abdominal pain which began on 3/29. She states recent history is significant for travel to [place].  States she did began with some nausea and pain at that time with some diarrhea.  She states pain has intensified.  She has tried hydrocodone with some modest improvement pain.  She reports fever with T-max of 102° that does reduced with the hydrocodone.  Denies any bloody diarrhea.  Does report some tenesmus and that food exacerbates the pain.  Reports decreased appetite.  Denies any vomiting. She does report a spontaneous miscarriage 2 weeks ago and had outpatient ultrasound confirming no retained products or abnormalities earlier this week.  She denies any recent antibiotic use other than Bactrim last month.  She denies any known sick contacts or suspected food contamination.     ED workup with reassuring findings on blood work.  CT abdomen pelvic revealed:  Fluid-filled small bowel, loops are upper limit of normal in caliber.  Finding is nonspecific, correlation with any history of enteritis.     Patient admitted to ED OU for supportive care for enteritis, abdominal pain     Observation Course:    -VSS overnight, remains afebrile   - Some improvement in pain, now rates 6/10 versus 10/10, but no significant resolution  - No bowel movement yet, was given miralax and is drinking coffee and hydrating with PO and IV fluids  - advanced to soft diet this morning without worsening of symptoms, no episodes of emesis  - Pelvic US without concerning findings for cause of pain  - Pelvic exam revealed some pelvic discomfort, CMT, and yellow discharge in the vaginal vault. Further history obtained reveals patient had a sexual partner who was unfaithful since the last time she had a negative STI test. Discussed with patient her possible  exposure to an STI and that her symptoms could be PID and decided to treat empirically with IV rocephin and doxy  - Vaginal screen shows BV, given flagyl here  - Patient successfully had bowel movement, pending stool studies        ED/OBS Workup:  Vitals:    04/01/23 0400 04/01/23 0425 04/01/23 0810 04/01/23 1226   BP: (!) 93/51 (!) 93/51 96/65 100/60   Pulse: (!) 59 (!) 49 (!) 52 (!) 53   Resp: 18 15     Temp: 97.9 °F (36.6 °C) 97.9 °F (36.6 °C) 98.3 °F (36.8 °C) 98.2 °F (36.8 °C)   TempSrc:   Oral Oral   SpO2:  100% 100% 100%   Weight:       Height:        04/01/23 1711   BP: 111/71   Pulse: (!) 56   Resp:    Temp: 97.9 °F (36.6 °C)   TempSrc: Oral   SpO2: 100%   Weight:    Height:        Labs Reviewed   VAGINAL SCREEN - Abnormal; Notable for the following components:       Result Value    Clue Cells Few (*)     WBC - Vaginal Screen Many (*)     Bacteria - Vaginal Screen Few (*)     All other components within normal limits    Narrative:     Release to patient->Immediate   URINALYSIS, REFLEX TO URINE CULTURE - Abnormal; Notable for the following components:    Protein, UA Trace (*)     Leukocytes, UA Trace (*)     All other components within normal limits    Narrative:     Specimen Source->Urine   URINALYSIS MICROSCOPIC - Abnormal; Notable for the following components:    WBC, UA 9 (*)     All other components within normal limits    Narrative:     Specimen Source->Urine   CBC W/ AUTO DIFFERENTIAL - Abnormal; Notable for the following components:    RBC 5.51 (*)     All other components within normal limits   COMPREHENSIVE METABOLIC PANEL - Abnormal; Notable for the following components:    Sodium 135 (*)     Anion Gap 7 (*)     All other components within normal limits   BASIC METABOLIC PANEL - Abnormal; Notable for the following components:    CO2 22 (*)     BUN 4 (*)     Calcium 8.0 (*)     Anion Gap 5 (*)     All other components within normal limits   CULTURE, BLOOD   CULTURE, BLOOD   CULTURE, STOOL   C.  TRACHOMATIS/N. GONORRHOEAE BY AMP DNA   ENTEROHEMORRHAGIC E.COLI   HIV 1 / 2 ANTIBODY    Narrative:     Release to patient->Immediate   HEPATITIS C ANTIBODY    Narrative:     Release to patient->Immediate   CBC W/ AUTO DIFFERENTIAL   OCCULT BLOOD X 1, STOOL   WBC, STOOL   ROTAVIRUS ANTIGEN, STOOL   CALPROTECTIN, STOOL   GIARDIA/CRYPTOSPORIDIUM (EIA)   STOOL EXAM-OVA,CYSTS,PARASITES   POCT URINE PREGNANCY   ISTAT LACTATE       US Pelvis Comp with Transvag NON-OB (xpd   Final Result      1. 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   2. Small volume of free pelvic fluid.   3. Physiologic right ovarian cyst.         Electronically signed by: Gian Richardson MD   Date:    04/01/2023   Time:    12:50      CT Abdomen Pelvis With Contrast   Final Result      1. Fluid-filled small bowel, loops are upper limit of normal in caliber.  Finding is nonspecific, correlation with any history of enteritis.   2. Suspected uterine leiomyoma, further evaluation with ultrasound as warranted.   3. Please see above for additional findings.         Electronically signed by: Adelso Braga MD   Date:    03/31/2023   Time:    12:40          Final Diagnosis:  1. Enteritis    2. Nausea & vomiting    3. BV (bacterial vaginosis)        Plan:  - Flagyl for BV  - Doxy for empiric treatment of STI, received IV rocephin here  - Zofran and Toradol for nausea and pain  - [place] [facility] [place]alax     Discharge Condition: Good    Disposition: Home or Self Care     Time spent on the discharge of the patient including review of hospital course with the patient. reviewing discharge medications and arranging follow-up care 35 minutes.  Patient was seen and examined on the date of discharge and determined to be suitable for discharge.    Follow Up:   ED Disposition Condition    Discharge Stable            ED Prescriptions       Medication Sig Dispense Start Date End Date Auth. Provider    ondansetron  (ZOFRAN-ODT) 4 MG TbDL Take 1 tablet (4 mg total) by mouth every 8 (eight) hours as needed (for nausea). 30 tablet 4/1/2023 -- Lizet Quiles PA-C    ketorolac (TORADOL) 10 mg tablet Take 1 tablet (10 mg total) by mouth every 6 (six) hours. for 5 days 20 tablet 4/1/2023 4/6/2023 Lizet Quiles PA-C    metroNIDAZOLE (FLAGYL) 500 MG tablet Take 1 tablet (500 mg total) by mouth every 12 (twelve) hours. for 7 days 14 tablet 4/1/2023 4/8/2023 Lizet Quiles PA-C    doxycycline (VIBRAMYCIN) 100 MG Cap Take 1 capsule (100 mg total) by mouth 2 (two) times daily. for 7 days 14 capsule 4/1/2023 4/8/2023 Lizet Quiles PA-C    polyethylene glycol (GLYCOLAX) 17 gram PwPk Take 17 g by mouth once daily. 10 each 4/1/2023 -- Lizet Quiles PA-C          Follow-up Information    None         No future appointments.

## 2023-04-01 NOTE — ED NOTES
Pt states with pain in the R lower region of the abdomen that radiates to the R lower back and R thigh. Pain medication administered.

## 2023-04-01 NOTE — ED NOTES
Pt states was not able to have a bowel movement yet post Miralax. Encouraged pt to ambulate in sage for gastrointestinal motility. Pt ambulating several times in room.

## 2023-04-01 NOTE — ED NOTES
Pt tolerated clear liquid diet. Slowly advanced to soft foods family brought in from outside. Denies any nausea or vomiting.

## 2023-04-03 LAB
BACTERIA STL CULT: NORMAL
C TRACH DNA SPEC QL NAA+PROBE: NOT DETECTED
CRYPTOSP AG STL QL IA: NEGATIVE
E COLI SXT1 STL QL IA: NEGATIVE
E COLI SXT2 STL QL IA: NEGATIVE
G LAMBLIA AG STL QL IA: NEGATIVE
N GONORRHOEA DNA SPEC QL NAA+PROBE: NOT DETECTED
RV AG STL QL IA.RAPID: NEGATIVE

## 2023-04-04 LAB — WBC #/AREA STL HPF: NORMAL /[HPF]

## 2023-04-05 LAB
BACTERIA BLD CULT: NORMAL
BACTERIA BLD CULT: NORMAL

## 2023-04-06 LAB — CALPROTECTIN STL-MCNT: 131.8 MCG/G

## 2023-04-14 LAB — O+P STL MICRO: NORMAL
